# Patient Record
Sex: MALE | HISPANIC OR LATINO
[De-identification: names, ages, dates, MRNs, and addresses within clinical notes are randomized per-mention and may not be internally consistent; named-entity substitution may affect disease eponyms.]

---
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
## 2025-04-18 NOTE — HISTORY OF PRESENT ILLNESS
Please call patient.  His TSH is still very high and T4 is low.  Let's make sure he's taking the levothyroxine 50 mcg daily without any missed doses, and make sure he's taking it as recommended (on an empty stomach first thing in the morning about 1-2 hours away from other meds/supplements).  Depending on his answers to this, we will adjust accordingly.  If he is taking it every day with no missed doses, then we'll double his dose to 100 mcg.  If he is missing doses, would encourage him to make sure he's taking it daily and increase just to 75 mcg.  Either way, he'll need a recheck in 6-8 weeks.  [FreeTextEntry6] : 18 year old male brought to clinic by school staff after twisting right ankle playing basketball c/o pain 8/10 to medial cuneiform good sensation, positive movement of toes +swelling no bruising